# Patient Record
Sex: MALE | Race: WHITE | NOT HISPANIC OR LATINO | URBAN - METROPOLITAN AREA
[De-identification: names, ages, dates, MRNs, and addresses within clinical notes are randomized per-mention and may not be internally consistent; named-entity substitution may affect disease eponyms.]

---

## 2017-08-09 ENCOUNTER — OUTPATIENT (OUTPATIENT)
Dept: OUTPATIENT SERVICES | Facility: HOSPITAL | Age: 62
LOS: 1 days | Discharge: HOME | End: 2017-08-09
Payer: COMMERCIAL

## 2017-08-09 DIAGNOSIS — Z12.11 ENCOUNTER FOR SCREENING FOR MALIGNANT NEOPLASM OF COLON: ICD-10-CM

## 2017-08-09 PROCEDURE — 93010 ELECTROCARDIOGRAM REPORT: CPT

## 2017-08-15 ENCOUNTER — RESULT REVIEW (OUTPATIENT)
Age: 62
End: 2017-08-15

## 2017-08-15 ENCOUNTER — OUTPATIENT (OUTPATIENT)
Dept: OUTPATIENT SERVICES | Facility: HOSPITAL | Age: 62
LOS: 1 days | Discharge: HOME | End: 2017-08-15
Payer: COMMERCIAL

## 2017-08-15 VITALS
OXYGEN SATURATION: 98 % | RESPIRATION RATE: 21 BRPM | TEMPERATURE: 97 F | DIASTOLIC BLOOD PRESSURE: 92 MMHG | HEIGHT: 70 IN | WEIGHT: 229.94 LBS | HEART RATE: 89 BPM | SYSTOLIC BLOOD PRESSURE: 178 MMHG

## 2017-08-15 DIAGNOSIS — Z98.890 OTHER SPECIFIED POSTPROCEDURAL STATES: Chronic | ICD-10-CM

## 2017-08-15 PROCEDURE — 88305 TISSUE EXAM BY PATHOLOGIST: CPT | Mod: 26

## 2017-08-15 RX ORDER — SODIUM CHLORIDE 9 MG/ML
1000 INJECTION INTRAMUSCULAR; INTRAVENOUS; SUBCUTANEOUS
Qty: 0 | Refills: 0 | Status: DISCONTINUED | OUTPATIENT
Start: 2017-08-15 | End: 2017-08-30

## 2017-08-15 RX ADMIN — SODIUM CHLORIDE 75 MILLILITER(S): 9 INJECTION INTRAMUSCULAR; INTRAVENOUS; SUBCUTANEOUS at 08:32

## 2017-08-15 NOTE — ASU PATIENT PROFILE, ADULT - VISION (WITH CORRECTIVE LENSES IF THE PATIENT USUALLY WEARS THEM):
Partially impaired: cannot see medication labels or newsprint, but can see obstacles in path, and the surrounding layout; can count fingers at arm's length/glasses reading

## 2017-08-16 LAB — SURGICAL PATHOLOGY FINAL REPORT - CH: SIGNIFICANT CHANGE UP

## 2017-08-17 DIAGNOSIS — Z12.11 ENCOUNTER FOR SCREENING FOR MALIGNANT NEOPLASM OF COLON: ICD-10-CM

## 2017-08-17 DIAGNOSIS — E78.5 HYPERLIPIDEMIA, UNSPECIFIED: ICD-10-CM

## 2017-08-17 DIAGNOSIS — K64.8 OTHER HEMORRHOIDS: ICD-10-CM

## 2017-08-17 DIAGNOSIS — G47.33 OBSTRUCTIVE SLEEP APNEA (ADULT) (PEDIATRIC): ICD-10-CM

## 2017-08-17 DIAGNOSIS — K62.1 RECTAL POLYP: ICD-10-CM

## 2017-08-17 DIAGNOSIS — Z87.891 PERSONAL HISTORY OF NICOTINE DEPENDENCE: ICD-10-CM

## 2017-08-17 DIAGNOSIS — K57.30 DIVERTICULOSIS OF LARGE INTESTINE WITHOUT PERFORATION OR ABSCESS WITHOUT BLEEDING: ICD-10-CM

## 2017-08-17 DIAGNOSIS — K51.90 ULCERATIVE COLITIS, UNSPECIFIED, WITHOUT COMPLICATIONS: ICD-10-CM

## 2020-07-17 ENCOUNTER — EMERGENCY (EMERGENCY)
Facility: HOSPITAL | Age: 65
LOS: 0 days | Discharge: HOME | End: 2020-07-17
Payer: COMMERCIAL

## 2020-07-17 VITALS
SYSTOLIC BLOOD PRESSURE: 153 MMHG | OXYGEN SATURATION: 98 % | TEMPERATURE: 99 F | DIASTOLIC BLOOD PRESSURE: 81 MMHG | RESPIRATION RATE: 18 BRPM | HEART RATE: 95 BPM

## 2020-07-17 DIAGNOSIS — E78.5 HYPERLIPIDEMIA, UNSPECIFIED: ICD-10-CM

## 2020-07-17 DIAGNOSIS — Z98.890 OTHER SPECIFIED POSTPROCEDURAL STATES: Chronic | ICD-10-CM

## 2020-07-17 DIAGNOSIS — Z11.59 ENCOUNTER FOR SCREENING FOR OTHER VIRAL DISEASES: ICD-10-CM

## 2020-07-17 DIAGNOSIS — G47.30 SLEEP APNEA, UNSPECIFIED: ICD-10-CM

## 2020-07-17 DIAGNOSIS — Z89.021 ACQUIRED ABSENCE OF RIGHT FINGER(S): ICD-10-CM

## 2020-07-17 PROCEDURE — U0003: CPT

## 2020-07-17 PROCEDURE — 99283 EMERGENCY DEPT VISIT LOW MDM: CPT

## 2020-07-17 NOTE — ED ADULT TRIAGE NOTE - CHIEF COMPLAINT QUOTE
Patient provides verbal consent to receive results via text or email. Patient presents for COVID-19 testing; complains of covid testing for travel.

## 2020-07-17 NOTE — ED ADULT NURSE NOTE - NSIMPLEMENTINTERV_GEN_ALL_ED
Implemented All Universal Safety Interventions:  [place] to call system. Call bell, personal items and telephone within reach. Instruct patient to call for assistance. Room bathroom lighting operational. Non-slip footwear when patient is off stretcher. Physically safe environment: no spills, clutter or unnecessary equipment. Stretcher in lowest position, wheels locked, appropriate side rails in place.

## 2020-07-17 NOTE — ED STATDOCS - PATIENT PORTAL LINK FT
You can access the FollowMyHealth Patient Portal offered by  by registering at the following website: [email or web address]. By joining [employer]’s FollowMyHealth portal, you will also be able to view your health information using other applications (apps) compatible with our system.

## 2020-07-17 NOTE — ED STATDOCS - NSFOLLOWUPINSTRUCTIONS_ED_ALL_ED_FT
Pt here for COVID-19 testing. Pt non toxic. Pt was swabbed for COVID-19 in their car in drive through area. [facility] will call pt with results. return precautions given. Home self-quarantine recommended. Pt agrees with plan of  care.

## 2020-07-17 NOTE — ED STATDOCS - CLINICAL SUMMARY MEDICAL DECISION MAKING FREE TEXT BOX
Asymptomatic pt here for COVID-19 testing. Pt well-appearing, will swab and DC. Return precautions given.

## 2020-07-18 PROBLEM — E78.5 HYPERLIPIDEMIA, UNSPECIFIED: Chronic | Status: ACTIVE | Noted: 2017-08-15

## 2020-07-18 PROBLEM — G47.30 SLEEP APNEA, UNSPECIFIED: Chronic | Status: ACTIVE | Noted: 2017-08-15

## 2021-01-03 NOTE — ED STATDOCS - NS_EDPROVIDERDISPOUSERTYPE_ED_A_ED
Name band;
I have personally evaluated and examined the patient. The Attending was available to me as a supervising provider if needed.

## 2022-07-08 ENCOUNTER — APPOINTMENT (OUTPATIENT)
Dept: FAMILY MEDICINE | Facility: CLINIC | Age: 67
End: 2022-07-08

## 2022-08-01 ENCOUNTER — APPOINTMENT (OUTPATIENT)
Dept: PULMONOLOGY | Facility: CLINIC | Age: 67
End: 2022-08-01

## 2022-08-01 VITALS
TEMPERATURE: 97.3 F | SYSTOLIC BLOOD PRESSURE: 150 MMHG | WEIGHT: 248 LBS | HEART RATE: 89 BPM | HEIGHT: 70 IN | RESPIRATION RATE: 16 BRPM | BODY MASS INDEX: 35.5 KG/M2 | DIASTOLIC BLOOD PRESSURE: 87 MMHG

## 2022-08-01 DIAGNOSIS — M54.30 SCIATICA, UNSPECIFIED SIDE: ICD-10-CM

## 2022-08-01 DIAGNOSIS — M54.9 DORSALGIA, UNSPECIFIED: ICD-10-CM

## 2022-08-01 DIAGNOSIS — Z87.891 PERSONAL HISTORY OF NICOTINE DEPENDENCE: ICD-10-CM

## 2022-08-01 DIAGNOSIS — Z78.9 OTHER SPECIFIED HEALTH STATUS: ICD-10-CM

## 2022-08-01 PROCEDURE — 99204 OFFICE O/P NEW MOD 45 MIN: CPT

## 2022-08-01 RX ORDER — IBUPROFEN 600 MG/1
600 TABLET, FILM COATED ORAL
Refills: 0 | Status: ACTIVE | COMMUNITY

## 2022-08-01 NOTE — ASSESSMENT
[FreeTextEntry1] : This is a 67 year old male with past medical history of moderate obstructive sleep apnea here to establish care with me. \par \par #Obstructive sleep apnea–patient has a history of moderate obstructive sleep apnea diagnosed 5 years ago, but his symptoms have progressively worse since his initial diagnosis.  He is currently experiencing excessive daytime sleepiness, frequent nocturnal awakenings, loud snoring, and witnessed apneas.  He is interested in undergoing PAP therapy which will help him sleep given his spinal stenosis and sciatica as well.\par \par He was referred to the [facility] for a diagnostic HSAT. The ramifications of LEAH and its potential therapeutic modalities were discussed with the patient. The patient was cautioned on the importance of avoiding drowsy driving. He will follow up with us after the HSAT.\par \par Once his HSA T is done, we will schedule a virtual visit to follow-up with next options including home APAP versus just ordering an APAP machine.

## 2022-08-01 NOTE — PHYSICAL EXAM
[No Acute Distress] : no acute distress [Well Nourished] : well nourished [No Deformities] : no deformities [Normal Oropharynx] : normal oropharynx [Enlarged Base of the Tongue] : enlarged base of the tongue [IV] : Mallampati Class: IV [Normal Appearance] : normal appearance [No Neck Mass] : no neck mass [Normal Rate/Rhythm] : normal rate/rhythm [Normal S1, S2] : normal s1, s2 [No Murmurs] : no murmurs [No Resp Distress] : no resp distress [No Acc Muscle Use] : no acc muscle use [Clear to Auscultation Bilaterally] : clear to auscultation bilaterally [Normal Gait] : normal gait [No Cyanosis] : no cyanosis [No Edema] : no edema [1+ Pitting] : 1+ pitting [No Focal Deficits] : no focal deficits [Oriented x3] : oriented x3 [Normal Mood] : normal mood [Normal Affect] : normal affect [TextBox_105] : + clubbing

## 2022-08-01 NOTE — REVIEW OF SYSTEMS
[Back Pain] : back pain [Chronic Pain] : chronic pain [Obesity] : obesity [Negative] : Psychiatric [Arthralgias] : no arthralgias [Trauma/ Injury] : no trauma/ injury

## 2022-08-01 NOTE — HISTORY OF PRESENT ILLNESS
[Former] : former [< 20 pack-years] : < 20 pack-years [Never] : never [Obstructive Sleep Apnea] : obstructive sleep apnea [Awakes Unrefreshed] : awakes unrefreshed [Difficulty Maintaining Sleep] : difficulty maintaining sleep [Recent  Weight Gain] : recent weight gain [Snoring] : snoring [Witnessed Apneas] : witnessed apneas [DMS] : difficulty maintaining sleep [TextBox_4] : This is a 67-year-old male with significant past medical history of moderate obstructive sleep apnea diagnosed 5 years ago who comes in to establish care with me.  The patient underwent evaluation for sleep apnea roughly 5 years ago and was found to have moderate obstructive sleep apnea and underwent a mandibular advancement device.  He did not have a follow-up sleep study with his mandibular advancement device to determine its efficacy.  Patient indicates that he has been noncompliant with his mandibular advancement device but has been experiencing worsening quality of sleep.  States that he has gained roughly 18 pounds over the last several months.  Indicates that he has frequent nocturnal awakenings some of which are due to nocturia.  Wife indicates that there is loud snoring and witnessed apneas.  He requires at least 2 naps during the day in order to feel refreshed and ready to go.  He is interested in getting rediagnosed with sleep apnea so that he can obtain better therapy for his nose.\par \par \par Of note: \par The patient is a [place].  He is undergoing evaluation at hospitals for spinal stenosis and pain management for sciatica.\par \par ______________________________________________________________________________________ \par EPWORTH SLEEPINESS SCALE \par How likely are you to doze off or fall asleep in the situations described below, in contrast to feeling just tired? \par This refers to your usual way of life in recent times. \par Even if you haven't done some of these things recently, try to work out how they would have affected you. \par Use the following scale to choose one most appropriate number for each situation. \par \par 0 = Never would doze \par 1 = Slight chance of dozing \par 2 = Moderate chance of dozing \par 3 = High chance of dozing \par \par  \par 2........................................Sitting and reading \par 1........................................Watching TV \par 1........................................Sitting inactive in a public place (eg a theatre or a meeting) \par 2........................................As a passenger in a car for an hour without a break \par 3........................................Lying down to rest in the afternoon when circumstances permit \par 0........................................Sitting and talking to someone \par 2........................................Sitting quietly after lunch without alcohol \par 1........................................In a car, while stopped for a few minutes in traffic \par 12........................................TOTAL SCORE \par ______________________________________________________________________________________ \par \par  [Awakes with Dry Mouth] : does not awaken with dry mouth [Awakes with Headache] : does not awaken with headache [Difficulty Initiating Sleep] : does not have difficulty initiating sleep [Fatigue] : no fatigue [Nonrestorative Sleep] : denies nonrestorative sleep [Paresthesias] : denies paresthesias [Tired while Driving] : not tired while driving [Unintentional Sleep while Active] : no unintentional sleep while active [DIS] : does not have difficulty initiating sleep [Unusual Sleep Behavior] : no unusual sleep behavior [Hypersomnolence] : no hypersomnolence [Cataplexy] : no cataplexy [Sleep Paralysis] : no sleep paralysis [Hypnagogic Hallucinations] : no hypnagogic hallucinations [Hypnopompic Hallucinations] : no hypnopompic hallucinations [Lower Extremity Discomfort] : does not have lower extremity discomfort [Irresistible urge to move legs] : does not have irresistible urge to move legs [LE discomfort relieved by movement] : denies lower extremity discomfort relieved by movement [Late day/ Evening symptoms] : does not have late day/ evening symptoms [Sleep Disturbances due to LE symptoms] : denies sleep disturbances due to lower extremity symptoms [TextBox_77] : 10pm [TextBox_79] : 4am [TextBox_83] : 2 [TextBox_85] : 6 hours [TextBox_89] : 4-5 [ESS] : 12

## 2022-08-05 ENCOUNTER — NON-APPOINTMENT (OUTPATIENT)
Age: 67
End: 2022-08-05

## 2022-08-08 ENCOUNTER — APPOINTMENT (OUTPATIENT)
Dept: CARDIOLOGY | Facility: CLINIC | Age: 67
End: 2022-08-08

## 2022-08-08 ENCOUNTER — NON-APPOINTMENT (OUTPATIENT)
Age: 67
End: 2022-08-08

## 2022-08-08 VITALS
BODY MASS INDEX: 35.79 KG/M2 | DIASTOLIC BLOOD PRESSURE: 80 MMHG | HEIGHT: 70 IN | WEIGHT: 250 LBS | SYSTOLIC BLOOD PRESSURE: 160 MMHG | HEART RATE: 95 BPM | OXYGEN SATURATION: 96 %

## 2022-08-08 DIAGNOSIS — R06.09 OTHER FORMS OF DYSPNEA: ICD-10-CM

## 2022-08-08 PROCEDURE — 99203 OFFICE O/P NEW LOW 30 MIN: CPT | Mod: 25

## 2022-08-08 PROCEDURE — 93000 ELECTROCARDIOGRAM COMPLETE: CPT

## 2022-08-08 RX ORDER — DICLOFENAC SODIUM 75 MG/1
75 TABLET, DELAYED RELEASE ORAL
Qty: 60 | Refills: 0 | Status: DISCONTINUED | COMMUNITY
Start: 2022-06-15 | End: 2022-08-08

## 2022-08-08 NOTE — PHYSICAL EXAM
[Normal S1, S2] : normal S1, S2 [Soft] : abdomen soft [Non Tender] : non-tender [Normal Gait] : normal gait [No Edema] : no edema [Normal] : moves all extremities, no focal deficits, normal speech [Alert and Oriented] : alert and oriented [Normal Conjunctiva] : normal conjunctiva [de-identified] : Obese

## 2022-08-08 NOTE — DISCUSSION/SUMMARY
[FreeTextEntry1] : MCDONALD: Possibly multifactorial 2/2 obesity/15 LB weight gain/deconditioned; denies associated cardiac symptoms \par Obtain Echo Eval LV FX ( New onset MCDONALD ) \par Labs/D-Dimer ordered \par Decrease ETOH intake \par Exercise/weight loss \par Obtain copy of CTA Coronaries further review\par \par LEAH: Followed with Pulmonary/Sleep specialist for treatment \par \par Spinal stenosis: Management @ [facility]\par \par OV after testing \par \par Plan DW patient/Dr Prater

## 2022-08-08 NOTE — HISTORY OF PRESENT ILLNESS
[FreeTextEntry1] : 68 Y/O male here today for cardiac consult 2/2 abnormal EKG found on Pre admission testing 2021 prior to knee surgery; reports he was referred to Cardiologist in [place] underwent CTA Coronary arteries 2021 reports this was Normal and was cleared for surgery; he subsequently followed with this Cardiologist 2 more times during his post op course with no further Cardiac follow up until today with CC of MCDONALD past 3-4 months no associated CP/palpitations.dizziness/syncope; SOB alleviated with rest; reports over this time period he has had a 15 LB weight gain \par \par PMH: LEAH treated with oral appliance with poor compliance followed with Pulmonary/Sleep specialist, Obesity, \par spinal stenosis followed @ [facility] with Spine Specialist and Dr Irvin \par Denies HTN, HLD, CAD, CHF, MI, DM, \par \par PSH: B/L knee replacement\par \par ALL: NKDA\par \par Medications: Ibuprofen PRN \par \par Social: Former smoker for a 10 year period quit 40 years ago\par + ETOH 2 drinks/day ( wine or beer ) \par \par FH: Pituitary cancer father \par Mother Gastric cancer  ( Patient had colonoscopy )

## 2022-08-09 LAB
24R-OH-CALCIDIOL SERPL-MCNC: 38.2 PG/ML
ALBUMIN SERPL ELPH-MCNC: 4.5 G/DL
ALP BLD-CCNC: 72 U/L
ALT SERPL-CCNC: 16 U/L
ANION GAP SERPL CALC-SCNC: 14 MMOL/L
AST SERPL-CCNC: 15 U/L
BASOPHILS # BLD AUTO: 0.02 K/UL
BASOPHILS NFR BLD AUTO: 0.2 %
BILIRUB SERPL-MCNC: 0.8 MG/DL
BUN SERPL-MCNC: 19 MG/DL
CALCIUM SERPL-MCNC: 9.2 MG/DL
CHLORIDE SERPL-SCNC: 104 MMOL/L
CHOLEST SERPL-MCNC: 243 MG/DL
CO2 SERPL-SCNC: 21 MMOL/L
CREAT SERPL-MCNC: 0.78 MG/DL
DEPRECATED D DIMER PPP IA-ACNC: <150 NG/ML DDU
EGFR: 98 ML/MIN/1.73M2
EOSINOPHIL # BLD AUTO: 0.06 K/UL
EOSINOPHIL NFR BLD AUTO: 0.7 %
ESTIMATED AVERAGE GLUCOSE: 120 MG/DL
GLUCOSE SERPL-MCNC: 89 MG/DL
HBA1C MFR BLD HPLC: 5.8 %
HCT VFR BLD CALC: 39.7 %
HDLC SERPL-MCNC: 63 MG/DL
HGB BLD-MCNC: 13.3 G/DL
IMM GRANULOCYTES NFR BLD AUTO: 0.6 %
LDLC SERPL CALC-MCNC: 122 MG/DL
LYMPHOCYTES # BLD AUTO: 0.95 K/UL
LYMPHOCYTES NFR BLD AUTO: 11 %
MAGNESIUM SERPL-MCNC: 2 MG/DL
MAN DIFF?: NORMAL
MCHC RBC-ENTMCNC: 30.4 PG
MCHC RBC-ENTMCNC: 33.5 GM/DL
MCV RBC AUTO: 90.6 FL
MONOCYTES # BLD AUTO: 0.66 K/UL
MONOCYTES NFR BLD AUTO: 7.6 %
NEUTROPHILS # BLD AUTO: 6.91 K/UL
NEUTROPHILS NFR BLD AUTO: 79.9 %
NONHDLC SERPL-MCNC: 180 MG/DL
PLATELET # BLD AUTO: 204 K/UL
POTASSIUM SERPL-SCNC: 4.1 MMOL/L
PROT SERPL-MCNC: 6.8 G/DL
RBC # BLD: 4.38 M/UL
RBC # FLD: 14.2 %
SODIUM SERPL-SCNC: 139 MMOL/L
T3 SERPL-MCNC: 104 NG/DL
T4 SERPL-MCNC: 5.3 UG/DL
TRIGL SERPL-MCNC: 290 MG/DL
TSH SERPL-ACNC: 4.22 UIU/ML
WBC # FLD AUTO: 8.65 K/UL

## 2022-08-10 ENCOUNTER — NON-APPOINTMENT (OUTPATIENT)
Age: 67
End: 2022-08-10

## 2022-08-15 ENCOUNTER — APPOINTMENT (OUTPATIENT)
Dept: CARDIOLOGY | Facility: CLINIC | Age: 67
End: 2022-08-15

## 2022-08-15 ENCOUNTER — TRANSCRIPTION ENCOUNTER (OUTPATIENT)
Age: 67
End: 2022-08-15

## 2022-08-15 PROCEDURE — 93306 TTE W/DOPPLER COMPLETE: CPT

## 2022-08-26 ENCOUNTER — FORM ENCOUNTER (OUTPATIENT)
Age: 67
End: 2022-08-26

## 2022-08-26 ENCOUNTER — APPOINTMENT (OUTPATIENT)
Dept: SLEEP CENTER | Facility: CLINIC | Age: 67
End: 2022-08-26

## 2022-08-26 ENCOUNTER — OUTPATIENT (OUTPATIENT)
Dept: OUTPATIENT SERVICES | Facility: HOSPITAL | Age: 67
LOS: 1 days | End: 2022-08-26
Payer: COMMERCIAL

## 2022-08-26 DIAGNOSIS — Z98.890 OTHER SPECIFIED POSTPROCEDURAL STATES: Chronic | ICD-10-CM

## 2022-08-26 PROCEDURE — 95806 SLEEP STUDY UNATT&RESP EFFT: CPT

## 2022-08-26 PROCEDURE — 95806 SLEEP STUDY UNATT&RESP EFFT: CPT | Mod: 26

## 2022-08-29 ENCOUNTER — APPOINTMENT (OUTPATIENT)
Dept: CARDIOLOGY | Facility: CLINIC | Age: 67
End: 2022-08-29

## 2022-08-29 VITALS
WEIGHT: 242 LBS | OXYGEN SATURATION: 98 % | HEIGHT: 70 IN | DIASTOLIC BLOOD PRESSURE: 70 MMHG | HEART RATE: 93 BPM | BODY MASS INDEX: 34.65 KG/M2 | SYSTOLIC BLOOD PRESSURE: 170 MMHG

## 2022-08-29 DIAGNOSIS — M48.00 SPINAL STENOSIS, SITE UNSPECIFIED: ICD-10-CM

## 2022-08-29 PROCEDURE — 99214 OFFICE O/P EST MOD 30 MIN: CPT

## 2022-08-29 NOTE — PHYSICAL EXAM
[Normal Conjunctiva] : normal conjunctiva [Normal S1, S2] : normal S1, S2 [Soft] : abdomen soft [Non Tender] : non-tender [Normal Gait] : normal gait [No Edema] : no edema [Normal] : moves all extremities, no focal deficits, normal speech [Alert and Oriented] : alert and oriented [de-identified] : Obese

## 2022-08-29 NOTE — DISCUSSION/SUMMARY
[FreeTextEntry1] : 68 Y/O male here today for Pre op evaluation for Back surgery ( Laminectomy ) scheduled for 9/16/22 with Dr Mcfarland @ [facility].  Cliams to be feeling well Prior SOB improving with weight loss; denies exertional angina\par \par LV dysfunction mild by Echo; CT cors reviewed mild non obstructive disease \par WIll begin Baby ASA/Statin with LDL goal of 70  He knows to hold ASA prior to surgery\par \par HTN: Begin Coreg 12.5 MG BID\par OV 1-2 weeks \par Clearance after follow up BP check \par \par LEAH: Followed with Pulmonary/Sleep specialist for treatment \par \par Spinal stenosis: Management @ [facility]\par \par Plan DW patient

## 2022-08-29 NOTE — HISTORY OF PRESENT ILLNESS
[FreeTextEntry1] : 68 Y/O male here today for Pre op evaluation for Back surgery ( Laminectomy ) scheduled for 9/16/22 with Dr Mcfarland @ [facility].  Cliams to be feeling well Prior SOB improving with weight loss; denies exertional angina\par \par PMH: HTN, HLD, Sleep Apnea, Obesity , Mild LV Dysfunction on recent Echo 45-50% Prior CT Cors reviewed non obstructive disease <30% \par \par \par

## 2022-09-12 ENCOUNTER — APPOINTMENT (OUTPATIENT)
Dept: CARDIOLOGY | Facility: CLINIC | Age: 67
End: 2022-09-12

## 2022-09-12 VITALS
HEART RATE: 76 BPM | BODY MASS INDEX: 34.65 KG/M2 | SYSTOLIC BLOOD PRESSURE: 156 MMHG | OXYGEN SATURATION: 98 % | DIASTOLIC BLOOD PRESSURE: 80 MMHG | HEIGHT: 70 IN | WEIGHT: 242 LBS

## 2022-09-12 DIAGNOSIS — I10 ESSENTIAL (PRIMARY) HYPERTENSION: ICD-10-CM

## 2022-09-12 DIAGNOSIS — E78.5 HYPERLIPIDEMIA, UNSPECIFIED: ICD-10-CM

## 2022-09-12 DIAGNOSIS — I51.9 HEART DISEASE, UNSPECIFIED: ICD-10-CM

## 2022-09-12 PROCEDURE — 99214 OFFICE O/P EST MOD 30 MIN: CPT

## 2022-09-12 RX ORDER — ASPIRIN 81 MG/1
81 TABLET ORAL
Refills: 0 | Status: DISCONTINUED | COMMUNITY
Start: 2022-08-29 | End: 2022-09-12

## 2022-09-12 NOTE — DISCUSSION/SUMMARY
[FreeTextEntry1] : 68 Y/O male here today for blood pressure check after beginning Carvedilol which he is tolerating well \par SYS BP now 128 from 160\par His HTN is complicated by Back pain/sleep apnea and obesity\par He will continue Coreg now 6.26 MG BID for HTN/LV dysfunction  \par \par LV dysfunction mild by Echo; CT cors reviewed mild non obstructive disease \par WIll continue Baby ASA/Statin with LDL goal of 70 (  He knows to hold ASA prior to surgery )\par \par May proceed with planned surgery \par \par LEAH: Followed with Pulmonary/Sleep specialist for treatment \par \par Spinal stenosis: Management @ [facility]\par \par Plan DW patient

## 2022-09-12 NOTE — PHYSICAL EXAM
[Normal Conjunctiva] : normal conjunctiva [Normal S1, S2] : normal S1, S2 [Soft] : abdomen soft [Non Tender] : non-tender [Normal Gait] : normal gait [No Edema] : no edema [Normal] : moves all extremities, no focal deficits, normal speech [Alert and Oriented] : alert and oriented [de-identified] : Obese

## 2022-09-12 NOTE — HISTORY OF PRESENT ILLNESS
[FreeTextEntry1] : 66 Y/O male here today for blood pressure check after initiating Coreg Prior to Back surgery ( Laminectomy ) scheduled for 9/16/22 with Dr Mcfarland @ [facility].  Claims to be feeling well no CP/SOB \par \par PMH: HTN, HLD, Sleep Apnea, Obesity , Mild LV Dysfunction on recent Echo 45-50% Prior CT Cors reviewed non obstructive disease <30% \par \par \par Pressure improved \par \par

## 2022-09-13 ENCOUNTER — APPOINTMENT (OUTPATIENT)
Dept: PULMONOLOGY | Facility: CLINIC | Age: 67
End: 2022-09-13

## 2022-09-14 ENCOUNTER — APPOINTMENT (OUTPATIENT)
Dept: PULMONOLOGY | Facility: CLINIC | Age: 67
End: 2022-09-14

## 2022-09-14 DIAGNOSIS — G47.33 OBSTRUCTIVE SLEEP APNEA (ADULT) (PEDIATRIC): ICD-10-CM

## 2022-09-14 DIAGNOSIS — E66.9 OBESITY, UNSPECIFIED: ICD-10-CM

## 2022-09-14 PROCEDURE — 99215 OFFICE O/P EST HI 40 MIN: CPT | Mod: 95

## 2022-09-14 NOTE — ASSESSMENT
[FreeTextEntry1] : 66 y/o M with comorbid LV dysfunction with latest ECHO (8/15/22) showing an EF of 45-50%, HTN, HLD, spinal stenosis --scheduled for laminectomy at [facility] (9/16/22), presents to discuss HSAT results and management of SEVERE LEAH --GAUTAM 70/hr, associated with severe oxygen desaturations. The patient is symptomatic with snoring, witnessed apneas, and somnolent with an ESS of 10.  Of note, patient was initially diagnosed with moderate LEAH a few years ago  and was treated with a mandibular advancement device but currently notices worsening apnea symptoms, and sleep quality. At this point, OAT is not effectively treating his worsening severity of apneas. Central  and mixed apneas were also seen on the HSAT study, likely related to his LV dysfunction.. \par \par ----The ramifications of LEAH and CSA along with its potential therapeutic modalities were discussed with the patient and spouse at length. \par ----Patient declined recommendation for in-lab titration with a possible transition to ASV if central apneas persist, stating he does not want to spend the night in our lab and does not want to wait for a home titration study. \par --------Will order APAP from [facility] with close follow-up. Patient is aware that if central apnea persists, that he may need a titration study for which he is agreeable to do at that point. \par ----Encouraged patient to continue with weight loss. The role of weight loss as it relates to LEAH were discussed with the patient and spouse. \par ----The patient was cautioned on the importance of avoiding drowsy driving\par \par F/U in 6-8 weeks after receiving APAP device or sooner if needed.

## 2022-09-14 NOTE — REVIEW OF SYSTEMS
[Fatigue] : fatigue [Recent Wt Loss (___ Lbs)] : recent [unfilled] ~Ulb weight loss [Snoring] : snoring [Witnessed Apneas] : witnessed apnea [A.M. Dry Mouth] : a.m. dry mouth [Obesity] : obesity [Nocturia] : nocturia [Arthralgias] : arthralgias [Nasal Congestion] : no nasal congestion [Postnasal Drip] : no postnasal drip [Chest Pain] : no chest pain [CHF] : no congestive heart failure [Thyroid Disease] : no thyroid disease [Diabetes] : no diabetes  [History of Iron Deficiency] : no history of iron deficiency [A.M. Headache] : no headache present upon awakening [Heartburn] : no heartburn [Depression] : no depression [Anxious] : not anxious [FreeTextEntry9] : LV dysfunction: Echo (8/15/22) EF 45-50% [FreeTextEntry6] : laminectomy scheduled for 9/16/22

## 2022-09-14 NOTE — REASON FOR VISIT
[Home] : at home, [unfilled] , at the time of the visit. [Medical Office: ([facility])___] : at the medical office located in  [Patient] : the patient [Follow-Up] : a follow-up visit [Sleep Apnea] : sleep apnea [Spouse] : spouse

## 2022-09-14 NOTE — HISTORY OF PRESENT ILLNESS
[Snoring] : snoring [Witnessed Apneas] : witnessed sleep apnea [Frequent Nocturnal Awakening] : frequent nocturnal awakening [Unintentional Sleep While Inactive] : unintentional sleep while inactive [Awakening With Dry Mouth] : awakening with dry mouth [Daytime Somnolence] : daytime somnolence [DMS] : DMS [Hypersomnolence] : hypersomnolence [To Bed: ___] : ~he/she~ goes to bed at [unfilled] [Arises: ___] : arises at [unfilled] [Sleep Onset Latency: ___ minutes] : sleep onset latency of [unfilled] minutes reported [Nocturnal Awakenings: ___] : ~he/she~ typically has [unfilled] nocturnal awakenings [WASO: ___] : Wake time after sleep onset is [unfilled] [TST: ___] : Total sleep time is [unfilled] [Daytime Sleep: ___] : daytime sleep: [unfilled] [FreeTextEntry1] : 66 y/o M  diagnosed with moderate LEAH, roughly 5-years ago, with intermittent use of OAT, underwent repeat HSAT for worsening apnea-symptoms and presents today to discuss the results of the study and management. \par \par HSAT (8/27/22) showed an GAUTAM of 70/hr, with a T 90 of 66.9%. Alvaro oxygen saturation of 65%. \par Events were comprised mostly with obstructive, hypopneas, and mixed and central apneas (mixed and central AHI 31/hr). \par \par The patient did not have a follow-up sleep study with his mandibular advancement device to determine efficacy, and has been experiencing poor quality of sleep even when he utilizes it, and has been non-compliant with it. He currently endorses snoring, witnessed apneas, frequent nocturnal awakenings related to nocturia, and lower back pain--for which he is scheduled for a laminectomy this Friday, as well as somnolence primarily with inactivity. He admits to drowsy driving with no associated MVAs and denies actively falling asleep behind the wheel. Reports a 10-lb weight loss with current reported weight of 236 lbs since his last visit. Additional sleep related symptoms and sleep schedule is as indicated below. \par \par Laminectomy scheduled for 9/16/22 with [facility] for spinal stenosis and pain management for sciatica. \par \par LV dysfunction with latest Echo (8/15/22) showing an EF 45-50%. Comorbid HTN, HLD, Sciatica, Spinal Stenosis, Obesity\par  [Unintentional Sleep while Active] : no unintentional sleep while active [Awakes Unrefreshed] : does not awake unrefreshed [Awakes with Headache] : no headache upon awakening [Recent  Weight Gain] : no recent weight gain [DIS] : no DIS [Unusual Sleep Behavior] : no unusual sleep behavior [Cataplexy] : no cataplexy [Sleep Paralysis] : no sleep paralysis [Hypnagogic Hallucinations] : no hallucinations when falling asleep [Hypnopompic Hallucinations] : no hallucinations when awakening [Nocturnal Oxygen] : The patient does not use nocturnal oxygen [ESS] : 10

## 2022-09-15 ENCOUNTER — FORM ENCOUNTER (OUTPATIENT)
Age: 67
End: 2022-09-15

## 2022-09-20 ENCOUNTER — RX CHANGE (OUTPATIENT)
Age: 67
End: 2022-09-20

## 2022-09-22 ENCOUNTER — RX CHANGE (OUTPATIENT)
Age: 67
End: 2022-09-22

## 2022-10-04 DIAGNOSIS — G47.33 OBSTRUCTIVE SLEEP APNEA (ADULT) (PEDIATRIC): ICD-10-CM

## 2022-10-20 ENCOUNTER — RX CHANGE (OUTPATIENT)
Age: 67
End: 2022-10-20

## 2022-12-18 ENCOUNTER — RX RENEWAL (OUTPATIENT)
Age: 67
End: 2022-12-18

## 2022-12-18 RX ORDER — ROSUVASTATIN CALCIUM 5 MG/1
5 TABLET, FILM COATED ORAL DAILY
Qty: 90 | Refills: 0 | Status: ACTIVE | COMMUNITY
Start: 2022-08-29 | End: 1900-01-01

## 2023-03-21 RX ORDER — CARVEDILOL 6.25 MG/1
6.25 TABLET, FILM COATED ORAL
Qty: 180 | Refills: 1 | Status: ACTIVE | COMMUNITY
Start: 2022-08-29 | End: 1900-01-01

## 2023-04-15 ENCOUNTER — EMERGENCY (EMERGENCY)
Facility: HOSPITAL | Age: 68
LOS: 0 days | Discharge: HOME | End: 2023-04-15
Attending: STUDENT IN AN ORGANIZED HEALTH CARE EDUCATION/TRAINING PROGRAM
Payer: COMMERCIAL

## 2023-04-15 VITALS
TEMPERATURE: 98 F | RESPIRATION RATE: 18 BRPM | SYSTOLIC BLOOD PRESSURE: 154 MMHG | OXYGEN SATURATION: 98 % | DIASTOLIC BLOOD PRESSURE: 65 MMHG | HEART RATE: 87 BPM

## 2023-04-15 VITALS — HEIGHT: 70 IN | WEIGHT: 229.94 LBS

## 2023-04-15 DIAGNOSIS — I10 ESSENTIAL (PRIMARY) HYPERTENSION: ICD-10-CM

## 2023-04-15 DIAGNOSIS — G47.30 SLEEP APNEA, UNSPECIFIED: ICD-10-CM

## 2023-04-15 DIAGNOSIS — E78.5 HYPERLIPIDEMIA, UNSPECIFIED: ICD-10-CM

## 2023-04-15 DIAGNOSIS — R23.8 OTHER SKIN CHANGES: ICD-10-CM

## 2023-04-15 DIAGNOSIS — R22.32 LOCALIZED SWELLING, MASS AND LUMP, LEFT UPPER LIMB: ICD-10-CM

## 2023-04-15 DIAGNOSIS — Z89.021 ACQUIRED ABSENCE OF RIGHT FINGER(S): ICD-10-CM
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
## 2023-04-15 DIAGNOSIS — M79.645 PAIN IN LEFT FINGER(S): ICD-10-CM
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
## 2023-04-15 DIAGNOSIS — Z98.890 OTHER SPECIFIED POSTPROCEDURAL STATES: Chronic | ICD-10-CM
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
## 2023-04-15 LAB
BASOPHILS # BLD AUTO: 0.03 K/UL — SIGNIFICANT CHANGE UP (ref 0–0.2)
BASOPHILS NFR BLD AUTO: 0.3 % — SIGNIFICANT CHANGE UP (ref 0–2)
EOSINOPHIL # BLD AUTO: 0.09 K/UL — SIGNIFICANT CHANGE UP (ref 0–0.5)
EOSINOPHIL NFR BLD AUTO: 1 % — SIGNIFICANT CHANGE UP (ref 0–6)
ERYTHROCYTE [SEDIMENTATION RATE] IN BLOOD: 32 MM/HR — HIGH (ref 0–20)
HCT VFR BLD CALC: 40.1 % — SIGNIFICANT CHANGE UP (ref 39–50)
HGB BLD-MCNC: 13.5 G/DL — SIGNIFICANT CHANGE UP (ref 13–17)
IMM GRANULOCYTES NFR BLD AUTO: 0.3 % — SIGNIFICANT CHANGE UP (ref 0–0.9)
LYMPHOCYTES # BLD AUTO: 0.85 K/UL — LOW (ref 1–3.3)
LYMPHOCYTES # BLD AUTO: 9.5 % — LOW (ref 13–44)
MCHC RBC-ENTMCNC: 30.5 PG — SIGNIFICANT CHANGE UP (ref 27–34)
MCHC RBC-ENTMCNC: 33.7 GM/DL — SIGNIFICANT CHANGE UP (ref 32–36)
MCV RBC AUTO: 90.5 FL — SIGNIFICANT CHANGE UP (ref 80–100)
MONOCYTES # BLD AUTO: 0.72 K/UL — SIGNIFICANT CHANGE UP (ref 0–0.9)
MONOCYTES NFR BLD AUTO: 8 % — SIGNIFICANT CHANGE UP (ref 2–14)
NEUTROPHILS # BLD AUTO: 7.27 K/UL — SIGNIFICANT CHANGE UP (ref 1.8–7.4)
NEUTROPHILS NFR BLD AUTO: 80.9 % — HIGH (ref 43–77)
PLATELET # BLD AUTO: 180 K/UL — SIGNIFICANT CHANGE UP (ref 150–400)
RBC # BLD: 4.43 M/UL — SIGNIFICANT CHANGE UP (ref 4.2–5.8)
RBC # FLD: 14.2 % — SIGNIFICANT CHANGE UP (ref 10.3–14.5)
WBC # BLD: 8.99 K/UL — SIGNIFICANT CHANGE UP (ref 3.8–10.5)
WBC # FLD AUTO: 8.99 K/UL — SIGNIFICANT CHANGE UP (ref 3.8–10.5)

## 2023-04-15 PROCEDURE — 96375 TX/PRO/DX INJ NEW DRUG ADDON: CPT

## 2023-04-15 PROCEDURE — 85652 RBC SED RATE AUTOMATED: CPT

## 2023-04-15 PROCEDURE — 73130 X-RAY EXAM OF HAND: CPT | Mod: 26,LT

## 2023-04-15 PROCEDURE — 73130 X-RAY EXAM OF HAND: CPT | Mod: LT

## 2023-04-15 PROCEDURE — 36415 COLL VENOUS BLD VENIPUNCTURE: CPT

## 2023-04-15 PROCEDURE — 99285 EMERGENCY DEPT VISIT HI MDM: CPT

## 2023-04-15 PROCEDURE — 86140 C-REACTIVE PROTEIN: CPT

## 2023-04-15 PROCEDURE — 85025 COMPLETE CBC W/AUTO DIFF WBC: CPT

## 2023-04-15 PROCEDURE — 96374 THER/PROPH/DIAG INJ IV PUSH: CPT

## 2023-04-15 PROCEDURE — 99284 EMERGENCY DEPT VISIT MOD MDM: CPT | Mod: 25

## 2023-04-15 RX ORDER — OXYCODONE AND ACETAMINOPHEN 5; 325 MG/1; MG/1
1 TABLET ORAL
Qty: 16 | Refills: 0
Start: 2023-04-15 | End: 2023-04-18

## 2023-04-15 RX ORDER — VANCOMYCIN HCL 1 G
1000 VIAL (EA) INTRAVENOUS ONCE
Refills: 0 | Status: DISCONTINUED | OUTPATIENT
Start: 2023-04-15 | End: 2023-04-15

## 2023-04-15 RX ORDER — CEPHALEXIN 500 MG
1 CAPSULE ORAL
Qty: 12 | Refills: 0
Start: 2023-04-15 | End: 2023-04-17

## 2023-04-15 RX ORDER — AMPICILLIN SODIUM AND SULBACTAM SODIUM 250; 125 MG/ML; MG/ML
3 INJECTION, POWDER, FOR SUSPENSION INTRAMUSCULAR; INTRAVENOUS ONCE
Refills: 0 | Status: COMPLETED | OUTPATIENT
Start: 2023-04-15 | End: 2023-04-15

## 2023-04-15 RX ORDER — VANCOMYCIN HCL 1 G
1500 VIAL (EA) INTRAVENOUS ONCE
Refills: 0 | Status: COMPLETED | OUTPATIENT
Start: 2023-04-15 | End: 2023-04-15

## 2023-04-15 RX ADMIN — AMPICILLIN SODIUM AND SULBACTAM SODIUM 200 GRAM(S): 250; 125 INJECTION, POWDER, FOR SUSPENSION INTRAMUSCULAR; INTRAVENOUS at 14:17

## 2023-04-15 RX ADMIN — Medication 300 MILLIGRAM(S): at 14:51

## 2023-04-15 NOTE — ED ADULT TRIAGE NOTE - CHIEF COMPLAINT QUOTE
Pt presents to ER c/o swelling and pain in left middle finger. 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 Pt believes its a piece of wood but unknown when it occurred

## 2023-04-15 NOTE — ED ADULT NURSE NOTE - OBJECTIVE STATEMENT
68 year old male with PMHx of HTN, pshx bilateral knee replacement and laminectomy presents to the ED c/o left third digit pain and swelling. Pt works as a hansen and routinely gets splinters, believes he may have a splinter stuck inside finger. Overnight, finger began swelling and becoming discolored. pt unable to find splinter, notes some clear liquid draining from the finger.

## 2023-04-15 NOTE — ED PROVIDER NOTE - PHYSICAL EXAMINATION
Constitutional: Awake, Alert, non-toxic. No acute distress.  HEAD: Normocephalic, atraumatic.   EYES: PERRL, EOM intact, conjunctiva and sclera are clear bilaterally.  ENT: External ears normal. No rhinorrhea, no tracheal deviation   NECK: Supple, non-tender  CARDIOVASCULAR: regular rate  RESPIRATORY: Normal respiratory effort; Speaking in full sentences. No accessory muscle use.   MSK:  no lower extremity edema, no deformities, left middle finger with swelling and erythema over the DIP, no foreign body seen, able to fully flex and extend, no TTP over MCP  SKIN: Warm, dry  NEURO: A&O x3. Sensory and motor functions are grossly intact. Speech is normal. No facial droop.  PSYCH: Appearance and judgement seem appropriate for gender and age.

## 2023-04-15 NOTE — CONSULT NOTE ADULT - SUBJECTIVE AND OBJECTIVE BOX
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 Patient tried to dig out the splinters with a utility knife but wasn't able to pull anything out. He noticed some swelling at night which worsened by this morning and was accompanied by some erythema and ecchymosis. Patient tried to relieve the swelling with a utility knife and noticed serous drainage which prompted him to present to ED for further evaluation. Denies numbness/tingling in the affected extremity. Denies fevers or chills.       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        Home Medications:  multivitamin: 1   once a day (15 Aug 2017 08:28)    Allergies    No Known Allergies    Intolerances                              13.5   8.99  )-----------( 180      ( 15 Apr 2023 14:10 )             40.1                   Vital Signs Last 24 Hrs  T(C): 36.7 (15 Apr 2023 09:04), Max: 36.7 (15 Apr 2023 09:04)  T(F): 98.1 (15 Apr 2023 09:04), Max: 98.1 (15 Apr 2023 09:04)  HR: 91 (15 Apr 2023 09:04) (91 - 91)  BP: 162/68 (15 Apr 2023 09:04) (162/68 - 162/68)  BP(mean): --  RR: 18 (15 Apr 2023 09:04) (18 - 18)  SpO2: 96% (15 Apr 2023 09:04) (96% - 96%)    Parameters below as of 15 Apr 2023 09:04  Patient On (Oxygen Delivery Method): room air        PHYSICAL EXAM  General: NAD, Awake and Alert  left middle finger  fusiform swelling  mild pain along flexor tendon at distal and middle phalanx  no pain with passive stretch, no flexed posturing  small puncture hole on radial aspect of DIPJ and superficial lac over volar DIPJ where he used a knife  dried serous drainage visible, no expressible drainage  AIN/PIN/uln intact  SILT across all aspects of finger  compartments soft and compressible  finger wwp  able to bend DIP without pain    IMAGING:  XR L middle finger: no acute fracture, possible FB seen on read    Assessment/Plan:  68y Male with left middle finger swelling and inflammatory reaction s/p splinter injury    -IV abx: vanco and unasyn x1 while in ED  -send home on oral abx: doxycycline  -Pain control as needed  -WBAT LUE in alumifoam splint to L middle finger  -No acute orthopaedic surgical intervention indicated at this time. This patient is orthopaedically stable for discharge.   -Patient to follow up with Dr. Victor on Monday as an outpatient for further evaluation and management.   -All of the patient's questions and concerns were answered and addressed.  -discussed with Dr. Victor who agrees with plan

## 2023-04-15 NOTE — ED PROVIDER NOTE - CLINICAL SUMMARY MEDICAL DECISION MAKING FREE TEXT BOX
Concern for foreign body in left finger. Exam not consistent with flexor tenosynovitis, no purulence to suggest abscess. Unlikely fx. Will get x-ray, likely consult hand.

## 2023-04-15 NOTE — ED PROVIDER NOTE - CARE PROVIDER_API CALL
Carmella Victor)  Orthopaedic Surgery; Surgery of the Hand  [address] [place] [address]  Phone: [phone]  Fax: [phone]  Scheduled Appointment: 04/17/2023

## 2023-04-15 NOTE — ED PROVIDER NOTE - PROGRESS NOTE DETAILS
Patient seen by Dr. Victor, recommended IV antibiotics here, discharged with doxycycline and follow-up in his office on Monday.  Patient agreeable with plan.  Will discharge.  Plan to discharge patient. Return to ED precautions were discussed with the patient/family. All questions were answered. Justus Light MD.

## 2023-04-15 NOTE — ED PROVIDER NOTE - NSFOLLOWUPINSTRUCTIONS_ED_ALL_ED_FT
Cellulitis, Adult     Cellulitis is a skin infection. The infected area is usually warm, red, swollen, and tender. This condition occurs most often in the arms and lower legs. The infection can travel to the muscles, blood, and underlying tissue and become serious. It is very important to get treated for this condition.    What are the causes?  Cellulitis is caused by bacteria. The bacteria enter through a break in the skin, such as a cut, burn, insect bite, open sore, or crack.    What increases the risk?    This condition is more likely to occur in people who:  •Have a weak body defense system (immune system).  •Have open wounds on the skin, such as cuts, burns, bites, and scrapes. Bacteria can enter the body through these open wounds.  •Are older than 60 years of age.  •Have diabetes.  •Have a type of long-lasting (chronic) liver disease (cirrhosis) or kidney disease.  •Are obese.    •Have a skin condition such as:  •Itchy rash (eczema).  •Slow movement of blood in the veins (venous stasis).  •Fluid buildup below the skin (edema).  •Have had radiation therapy.  •Use IV drugs.    What are the signs or symptoms?    Symptoms of this condition include:  •Redness, streaking, or spotting on the skin.  •Swollen area of the skin.  •Tenderness or pain when an area of the skin is touched.  •Warm skin.  •A fever.  •Chills.  •Blisters.    How is this diagnosed?    This condition is diagnosed based on a medical history and physical exam. You may also have tests, including:  •Blood tests.  •Imaging tests.    How is this treated?    Treatment for this condition may include:  •Medicines, such as antibiotic medicines or medicines to treat allergies (antihistamines).  •Supportive care, such as rest and application of cold or warm cloths (compresses) to the skin.  •Hospital care, if the condition is severe.    The infection usually starts to get better within 1–2 days of treatment.    Follow these instructions at home:     Medicines   •Take over-the-counter and prescription medicines only as told by your health care provider.  •If you were prescribed an antibiotic medicine, take it as told by your health care provider. Do not stop taking the antibiotic even if you start to feel better.    General instructions   •Drink enough fluid to keep your urine pale yellow.  • Do not touch or rub the infected area.  •Raise (elevate) the infected area above the level of your heart while you are sitting or lying down.  •Apply warm or cold compresses to the affected area as told by your health care provider.  •Keep all follow-up visits as told by your health care provider. This is important. These visits let your health care provider make sure a more serious infection is not developing.    Contact a health care provider if:  •You have a fever.  •Your symptoms do not begin to improve within 1–2 days of starting treatment.  •Your bone or joint underneath the infected area becomes painful after the skin has healed.  •Your infection returns in the same area or another area.  •You notice a swollen bump in the infected area.  •You develop new symptoms.  •You have a general ill feeling (malaise) with muscle aches and pains.    Get help right away if:  •Your symptoms get worse.  •You feel very sleepy.  •You develop vomiting or diarrhea that persists.  •You notice red streaks coming from the infected area.  •Your red area gets larger or turns dark in color.    These symptoms may represent a serious problem that is an emergency. Do not wait to see if the symptoms will go away. Get medical help right away. Call your local emergency services (911 in the [place] Do not drive yourself to the hospital.     Summary  •Cellulitis is a skin infection. This condition occurs most often in the arms and lower legs.  •Treatment for this condition may include medicines, such as antibiotic medicines or antihistamines.  •Take over-the-counter and prescription medicines only as told by your health care provider. If you were prescribed an antibiotic medicine, do not stop taking the antibiotic even if you start to feel better.  •Contact a health care provider if your symptoms do not begin to improve within 1–2 days of starting treatment or your symptoms get worse.  •Keep all follow-up visits as told by your health care provider. This is important. These visits let your health care provider make sure that a more serious infection is not developing.    This information is not intended to replace advice given to you by your health care provider. Make sure you discuss any questions you have with your health care provider.    Document Revised: 12/28/2020 Document Reviewed: 05/09/2019    [employer] Patient Education © 2022 [employer]

## 2023-04-15 NOTE — ED PROVIDER NOTE - OBJECTIVE STATEMENT
68 year old male with PMHx of HTN, s/p laminectomy 10/2022 presents to the ED c/o left third digit pain and swelling. Pt works as a hansen and routinely gets splinters. Pt states that he felt like he had a splinter in his left third digit and then overnight it began to swell and become discolored. Pt tried to remove it but could not find anything, and noted some clear liquid draining from the finger. Notes pain to the upper left third digit. Pt is right hand dominant. No other injuries or complaints.

## 2023-04-15 NOTE — ED ADULT NURSE NOTE - CHIEF COMPLAINT QUOTE
Pt presents to ER c/o swelling and pain in left middle finger. 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 Pt believes its a piece of wood but unknown when it occurred

## 2023-04-15 NOTE — ED PROVIDER NOTE - PATIENT PORTAL LINK FT
You can access the FollowMyHealth Patient Portal offered by [facility] by registering at the following website: [email or web address]. By joining [facility]’s FollowMyHealth portal, you will also be able to view your health information using other applications (apps) compatible with our system.

## 2023-04-16 LAB — CRP SERPL-MCNC: 34 MG/L — HIGH

## 2023-07-11 NOTE — ASU PREOP CHECKLIST - NS PREOP CHK CHLOROHEX WASH
Patient should have refills on file.     Sukhjinder JENKINS RN 7/11/2023 at 2:58 PM    
N/A
Diarrhea, unspecified type